# Patient Record
(demographics unavailable — no encounter records)

---

## 2025-06-10 NOTE — PHYSICAL EXAM
[Person] : oriented to person [Place] : oriented to place [Time] : oriented to time [Concentration Intact] : normal concentrating ability [Visual Intact] : visual attention was ~T not ~L decreased [Naming Objects] : no difficulty naming common objects [Repeating Phrases] : no difficulty repeating a phrase [Writing A Sentence] : no difficulty writing a sentence [Fluency] : fluency intact [Comprehension] : comprehension intact [Reading] : reading intact [Past History] : adequate knowledge of personal past history [Cranial Nerves Optic (II)] : visual acuity intact bilaterally,  visual fields full to confrontation, pupils equal round and reactive to light [Cranial Nerves Oculomotor (III)] : extraocular motion intact [Cranial Nerves Trigeminal (V)] : facial sensation intact symmetrically [Cranial Nerves Facial (VII)] : face symmetrical [Cranial Nerves Vestibulocochlear (VIII)] : hearing was intact bilaterally [Cranial Nerves Glossopharyngeal (IX)] : tongue and palate midline [Cranial Nerves Accessory (XI - Cranial And Spinal)] : head turning and shoulder shrug symmetric [Cranial Nerves Hypoglossal (XII)] : there was no tongue deviation with protrusion [0] : Ankle jerk left 0 [Past-pointing] : there was no past-pointing [Tremor] : no tremor present [Plantar Reflex Right Only] : normal on the right [Plantar Reflex Left Only] : normal on the left [FreeTextEntry5] :  positive glabellar sign [FreeTextEntry6] :  mild cogwheel rigidity bilaterally, bradykinesia, unable to do alternating rapid movement [FreeTextEntry7] :  decreased sensation distally to light touch as well as, vibration/temperature [FreeTextEntry8] :  showed filling gait, cannot walk without assistance [FreeTextEntry1] : uses wheelchair

## 2025-06-10 NOTE — ASSESSMENT
[FreeTextEntry1] : 79 year old female with Parkinson's Disease. ROSE scan reviewed today confirms diagnosis. Continue Sinemet  TID. Info for movement specialist provided again. Patient will return to the office for re-evaluation in 6 months.   Supervising Physician : Olvin Tello DO

## 2025-06-10 NOTE — HISTORY OF PRESENT ILLNESS
[FreeTextEntry1] : Previous Encounter 2.4.25 :  Patient is a 79-year-old female presenting for follow-up of suspected Parkinson's disease. Last seen in July 2023. Patient was previously scheduled for a ROSE scan but appointment was cancelled due to unavailability of contrast material, and was never rescheduled. Patient reports her condition remains largely unchanged since last visit. Notable for minimal tremors but presents with bradykinesia. Patient's daughter reports that the patient attributes decreased mobility to lack of medication, though historical response to medication is unclear. No reported hallucinations or other medication side effects. Patient mentions having had some head imaging done in [place] approximately 2 years ago, and an MRI brain in 2023. Previously attempted physical therapy but reached insurance session limit.   Diagnostic Testing :   Brain DaTscan SPECT 6.4.25 : Abnormal study.  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  Today : Today I had the pleasure of seeing Ms. BAUTISTA in our office for follow up.  Their past medical history and imaging have been reviewed.    Patient  remains under our neurologic care for Parkinsons Disease. She was last seen in our office by Dr. Tello on 2.4.25. Today we reviewed her Rose SPECt Scan of the Brain completed 6.4.25 which confirmed this diagnosis. For management of this condition she remains on a regimen of Sinemet 25-250mg TID. At her last visit with Dr. Tello she was referred to a movement specialist to discuss advanced therapeutic options (deep brain stimulation, focused ultrasound) but has yet to do so. Patients tremors remain minimal however her chief complaint is of rigidity and bradykinesia. Patients family state she does at home PT and HEP. She denies any new or progressive symptoms at this time.